# Patient Record
Sex: FEMALE | URBAN - METROPOLITAN AREA
[De-identification: names, ages, dates, MRNs, and addresses within clinical notes are randomized per-mention and may not be internally consistent; named-entity substitution may affect disease eponyms.]

---

## 2021-03-04 ENCOUNTER — OFFICE VISIT (OUTPATIENT)
Dept: URBAN - METROPOLITAN AREA CLINIC 71 | Facility: CLINIC | Age: 71
End: 2021-03-04
Payer: COMMERCIAL

## 2021-03-04 DIAGNOSIS — H40.012 OPEN ANGLE WITH BORDERLINE FINDINGS, LOW RISK, LEFT EYE: ICD-10-CM

## 2021-03-04 DIAGNOSIS — H52.4 PRESBYOPIA: Primary | ICD-10-CM

## 2021-03-04 DIAGNOSIS — H35.372 PUCKERING OF MACULA, LEFT EYE: ICD-10-CM

## 2021-03-04 PROCEDURE — 92310 CONTACT LENS FITTING OU: CPT | Performed by: OPTOMETRIST

## 2021-03-04 PROCEDURE — 92004 COMPRE OPH EXAM NEW PT 1/>: CPT | Performed by: OPTOMETRIST

## 2021-03-04 ASSESSMENT — INTRAOCULAR PRESSURE
OS: 13
OD: 13

## 2021-03-04 ASSESSMENT — VISUAL ACUITY
OD: 20/20
OS: 20/20

## 2021-03-04 NOTE — IMPRESSION/PLAN
Impression: Puckering of macula, left eye: H35.372. Trace ERM OS. No treatment needed. continue observation. PT reports seeing a retinal specialist in [place] annually for observation of ERM. Plan: Epiretinal membranes do not usually require treatment, unless distorted vision or blurry vision occur. The main treatment consists of vitrectomy surgery with peeling off the ERM. An ERM can occur from aging, previous eye trauma, or chronic eye inflammation (such as uveitis). Contact the office if you experience a loss of vision or distortion.

## 2021-03-04 NOTE — IMPRESSION/PLAN
Impression: Presbyopia: H52.4. Bilateral. Plan: Presbyopia is the inability to focus on objects (ie: accommodate) due to the loss of flexibility of your natural lens. Presbyopia occurs with age. Reading glasses, bifocals, trifocals or contacts can be helpful. Contact the office if difficulty focusing persists despite corrective eye wear. New glasses and CTL RX given today.  
continue with annual vision exams

## 2021-03-04 NOTE — IMPRESSION/PLAN
Impression: Open angle with borderline findings, low risk, left eye: H40.012. Due to enlarged C/D Ratio OS. Normotensive IOP OS. PT was monitored by her old doctor in [place] [facility] for [address]. No recommendation for treatment at this time. recommend continued observation N/A for DFE w/ OCT Plan: Glaucoma suspects do not usually need to be treated but do need close follow up monitoring for early signs of glaucoma damage. Contact the office if any loss of vision, eye pain, cloudy vision, photophobia, or dark spots or clouds blocking portions of your vision occur.

## 2022-01-07 ENCOUNTER — OFFICE VISIT (OUTPATIENT)
Dept: URBAN - METROPOLITAN AREA CLINIC 71 | Facility: CLINIC | Age: 72
End: 2022-01-07
Payer: COMMERCIAL

## 2022-01-07 DIAGNOSIS — H25.13 AGE-RELATED NUCLEAR CATARACT, BILATERAL: ICD-10-CM

## 2022-01-07 DIAGNOSIS — H35.373 PUCKERING OF MACULA, BILATERAL: ICD-10-CM

## 2022-01-07 PROCEDURE — 76514 ECHO EXAM OF EYE THICKNESS: CPT

## 2022-01-07 PROCEDURE — 99213 OFFICE O/P EST LOW 20 MIN: CPT

## 2022-01-07 PROCEDURE — 92133 CPTRZD OPH DX IMG PST SGM ON: CPT

## 2022-01-07 ASSESSMENT — INTRAOCULAR PRESSURE
OD: 16
OS: 19

## 2022-01-07 ASSESSMENT — KERATOMETRY
OD: 45.63
OS: 46.00

## 2022-01-07 NOTE — IMPRESSION/PLAN
Impression: Open angle with borderline findings, low risk, left eye: H40.012. IOP today: 16 OD/19 OS 
C/D ratio: .5 OD/.6 OS Angles: Open Pachy: 538/560 IOP adjustment: +1/-1 Tmax: 16/19 Family history: None Gonio: None Surgeries: None Last RNFL: 93/76 Plan: Patient is okay to stay off drops at this time. Patient to return in 6 months with [facility] and [facility].

## 2022-01-07 NOTE — IMPRESSION/PLAN
Impression: Puckering of macula, bilateral: H35.373. No traction seen today. Stable. Plan: No surgical treatment recommended. Call if any changes or distortion in vision occur.